# Patient Record
Sex: FEMALE | Race: OTHER | URBAN - METROPOLITAN AREA
[De-identification: names, ages, dates, MRNs, and addresses within clinical notes are randomized per-mention and may not be internally consistent; named-entity substitution may affect disease eponyms.]

---

## 2019-08-07 ENCOUNTER — OFFICE VISIT (OUTPATIENT)
Dept: FAMILY MEDICINE CLINIC | Age: 64
End: 2019-08-07

## 2019-08-07 ENCOUNTER — HOSPITAL ENCOUNTER (OUTPATIENT)
Dept: LAB | Age: 64
Discharge: HOME | End: 2019-08-07

## 2019-08-07 VITALS
SYSTOLIC BLOOD PRESSURE: 146 MMHG | TEMPERATURE: 97.7 F | HEART RATE: 70 BPM | HEIGHT: 63 IN | BODY MASS INDEX: 25.16 KG/M2 | WEIGHT: 142 LBS | DIASTOLIC BLOOD PRESSURE: 90 MMHG

## 2019-08-07 DIAGNOSIS — E11.65 TYPE 2 DIABETES MELLITUS WITH HYPERGLYCEMIA, WITHOUT LONG-TERM CURRENT USE OF INSULIN (HCC): Primary | ICD-10-CM

## 2019-08-07 DIAGNOSIS — M54.50 CHRONIC MIDLINE LOW BACK PAIN WITHOUT SCIATICA: ICD-10-CM

## 2019-08-07 DIAGNOSIS — E11.65 TYPE 2 DIABETES MELLITUS WITH HYPERGLYCEMIA, WITHOUT LONG-TERM CURRENT USE OF INSULIN (HCC): ICD-10-CM

## 2019-08-07 DIAGNOSIS — G89.29 CHRONIC MIDLINE LOW BACK PAIN WITHOUT SCIATICA: ICD-10-CM

## 2019-08-07 DIAGNOSIS — R03.0 ELEVATED BLOOD PRESSURE READING WITHOUT DIAGNOSIS OF HYPERTENSION: ICD-10-CM

## 2019-08-07 LAB
ALBUMIN SERPL-MCNC: 4.1 G/DL (ref 3.5–5)
ALBUMIN/GLOB SERPL: 1.1 {RATIO} (ref 1.1–2.2)
ALP SERPL-CCNC: 54 U/L (ref 45–117)
ALT SERPL-CCNC: 45 U/L (ref 12–78)
ANION GAP SERPL CALC-SCNC: 5 MMOL/L (ref 5–15)
AST SERPL-CCNC: 20 U/L (ref 15–37)
BILIRUB SERPL-MCNC: 0.2 MG/DL (ref 0.2–1)
BUN SERPL-MCNC: 11 MG/DL (ref 6–20)
BUN/CREAT SERPL: 14 (ref 12–20)
CALCIUM SERPL-MCNC: 9.3 MG/DL (ref 8.5–10.1)
CHLORIDE SERPL-SCNC: 105 MMOL/L (ref 97–108)
CHOLEST SERPL-MCNC: 177 MG/DL
CO2 SERPL-SCNC: 29 MMOL/L (ref 21–32)
CREAT SERPL-MCNC: 0.8 MG/DL (ref 0.55–1.02)
EST. AVERAGE GLUCOSE BLD GHB EST-MCNC: 174 MG/DL
GLOBULIN SER CALC-MCNC: 3.8 G/DL (ref 2–4)
GLUCOSE POC: NORMAL MG/DL
GLUCOSE SERPL-MCNC: 217 MG/DL (ref 65–100)
HBA1C MFR BLD: 7.7 % (ref 4.2–6.3)
HDLC SERPL-MCNC: 52 MG/DL
HDLC SERPL: 3.4 {RATIO} (ref 0–5)
LDLC SERPL CALC-MCNC: 93.2 MG/DL (ref 0–100)
LIPID PROFILE,FLP: ABNORMAL
POTASSIUM SERPL-SCNC: 5.1 MMOL/L (ref 3.5–5.1)
PROT SERPL-MCNC: 7.9 G/DL (ref 6.4–8.2)
SODIUM SERPL-SCNC: 139 MMOL/L (ref 136–145)
TRIGL SERPL-MCNC: 159 MG/DL (ref ?–150)
TSH SERPL DL<=0.05 MIU/L-ACNC: 2.85 UIU/ML (ref 0.36–3.74)
VLDLC SERPL CALC-MCNC: 31.8 MG/DL

## 2019-08-07 PROCEDURE — 84443 ASSAY THYROID STIM HORMONE: CPT

## 2019-08-07 PROCEDURE — 80061 LIPID PANEL: CPT

## 2019-08-07 PROCEDURE — 80053 COMPREHEN METABOLIC PANEL: CPT

## 2019-08-07 PROCEDURE — 83036 HEMOGLOBIN GLYCOSYLATED A1C: CPT

## 2019-08-07 RX ORDER — ROSUVASTATIN CALCIUM 20 MG/1
20 TABLET, COATED ORAL
Qty: 90 TAB | Refills: 3 | Status: SHIPPED | OUTPATIENT
Start: 2019-08-07

## 2019-08-07 RX ORDER — METFORMIN HYDROCHLORIDE 1000 MG/1
1000 TABLET ORAL 2 TIMES DAILY WITH MEALS
Qty: 180 TAB | Refills: 3 | Status: SHIPPED | OUTPATIENT
Start: 2019-08-07

## 2019-08-07 RX ORDER — GLIPIZIDE 5 MG/1
5 TABLET ORAL 2 TIMES DAILY
Qty: 180 TAB | Refills: 3 | Status: SHIPPED | OUTPATIENT
Start: 2019-08-07

## 2019-08-07 NOTE — PROGRESS NOTES
Avs discussed with Michelle Nazario by Discharge Nurse Sunil Márquez LPN. Discussed medication prescribed today. Patient verbalized understanding and has no further questions.  AVS printed and given to patient Sunil Márquez LPN

## 2019-08-07 NOTE — PROGRESS NOTES
Results for orders placed or performed in visit on 08/07/19   AMB POC GLUCOSE BLOOD, BY GLUCOSE MONITORING DEVICE   Result Value Ref Range    Glucose POC nf 278 mg/dL

## 2019-08-07 NOTE — PROGRESS NOTES
HISTORY OF PRESENT ILLNESS  Adela Jenkins is a 75 y.o. female. HPI  Patient states  1. When she breath in deep she has some discomfort on the left side of her abdomen. Has been suffering with this for the past 3 months  2. Also has neck and back pain all the way down to the lumbar spine. Has been going on for many years. When is too painful takes an antiinflammatory. She was told she has spondylosis. Her symptoms are worse on the lumbar spine. She has received. She eats rice everyday lately. She has diabetes and takes glimepiride 4mg/metformin 850 mg  Has hyperlipidemia and takes Rosuvastatin 20 mg once a day. Review of Systems   Constitutional: Negative for chills and fever. HENT: Negative for ear pain, hearing loss and tinnitus. Respiratory: Negative for cough, hemoptysis and sputum production. Cardiovascular: Negative for chest pain, palpitations and orthopnea. Gastrointestinal: Positive for abdominal pain. Genitourinary: Negative for dysuria, frequency and urgency. Musculoskeletal: Positive for back pain. Neurological: Negative for dizziness, tingling and headaches. Psychiatric/Behavioral: Negative for depression and suicidal ideas. /90 (BP 1 Location: Left arm, BP Patient Position: Sitting)   Pulse 70   Temp 97.7 °F (36.5 °C) (Oral)   Ht 5' 2.99\" (1.6 m)   Wt 142 lb (64.4 kg)   BMI 25.16 kg/m²   Physical Exam   Constitutional: She appears well-developed and well-nourished. HENT:   Head: Normocephalic. Right Ear: External ear normal.   Left Ear: External ear normal.   Mouth/Throat: No oropharyngeal exudate. Neck: Normal range of motion. Neck supple. Cardiovascular: Normal rate, regular rhythm and normal heart sounds. No murmur heard. Pulmonary/Chest: Effort normal and breath sounds normal. No respiratory distress. She has no wheezes. She has no rales. Abdominal: Soft. She exhibits no distension. There is no tenderness. There is no rebound. Musculoskeletal: She exhibits tenderness. Lumbar back pain   Skin: Skin is warm. No erythema. ASSESSMENT and PLAN  Diagnoses and all orders for this visit:    1. Type 2 diabetes mellitus with hyperglycemia, without long-term current use of insulin ([facility])  -     AMB POC GLUCOSE BLOOD, BY GLUCOSE MONITORING DEVICE  -     metFORMIN (GLUCOPHAGE) 1,000 mg tablet; Take 1 Tab by mouth two (2) times daily (with meals). -     rosuvastatin (CRESTOR) 20 mg tablet; Take 1 Tab by mouth nightly.  -     glipiZIDE (GLUCOTROL) 5 mg tablet; Take 1 Tab by mouth two (2) times a day. -     LIPID PANEL; Future  -     METABOLIC PANEL, COMPREHENSIVE; Future  -     HEMOGLOBIN A1C WITH EAG; Future  -     TSH 3RD GENERATION; Future    2. Elevated blood pressure reading without diagnosis of hypertension    3.  Chronic midline low back pain without sciatica      Will change medications to metformin 1000 mg bid, glipizide 5 mg bid  Labs today  Stretching exercises discussed  Needs bp recheck, she may be hypertensive

## 2019-08-08 NOTE — PROGRESS NOTES
Normal thyroid, normal cholesterol levels, kidney function, liver function and electrolytes  Hemoglobin a1c is 7.7%,  Is close to target, we had made changes to her meds during the visit, and discussed changes in diet  No further recommendation, patient can be informed